# Patient Record
Sex: MALE | Race: WHITE
[De-identification: names, ages, dates, MRNs, and addresses within clinical notes are randomized per-mention and may not be internally consistent; named-entity substitution may affect disease eponyms.]

---

## 2021-03-22 ENCOUNTER — HOSPITAL ENCOUNTER (INPATIENT)
Dept: HOSPITAL 53 - M ED | Age: 31
LOS: 1 days | Discharge: HOME | End: 2021-03-23
Attending: STUDENT IN AN ORGANIZED HEALTH CARE EDUCATION/TRAINING PROGRAM | Admitting: INTERNAL MEDICINE
Payer: COMMERCIAL

## 2021-03-22 VITALS — SYSTOLIC BLOOD PRESSURE: 160 MMHG | DIASTOLIC BLOOD PRESSURE: 96 MMHG

## 2021-03-22 VITALS — BODY MASS INDEX: 26.8 KG/M2 | WEIGHT: 176.81 LBS | HEIGHT: 68 IN

## 2021-03-22 DIAGNOSIS — G40.89: ICD-10-CM

## 2021-03-22 DIAGNOSIS — F17.200: ICD-10-CM

## 2021-03-22 DIAGNOSIS — Z88.0: ICD-10-CM

## 2021-03-22 DIAGNOSIS — F10.239: Primary | ICD-10-CM

## 2021-03-22 LAB
ALBUMIN SERPL BCG-MCNC: 4.3 GM/DL (ref 3.2–5.2)
ALT SERPL W P-5'-P-CCNC: 77 U/L (ref 12–78)
BASOPHILS # BLD AUTO: 0.1 10^3/UL (ref 0–0.2)
BASOPHILS NFR BLD AUTO: 0.4 % (ref 0–1)
BILIRUB CONJ SERPL-MCNC: 0.4 MG/DL (ref 0–0.2)
BILIRUB SERPL-MCNC: 1.3 MG/DL (ref 0.2–1)
CK SERPL-CCNC: 352 U/L (ref 39–308)
EOSINOPHIL # BLD AUTO: 0 10^3/UL (ref 0–0.5)
EOSINOPHIL NFR BLD AUTO: 0.1 % (ref 0–3)
ETHANOL SERPL-MCNC: < 0.003 % (ref 0–0.01)
HCT VFR BLD AUTO: 44.4 % (ref 42–52)
HGB BLD-MCNC: 16.1 G/DL (ref 13.5–17.5)
LYMPHOCYTES # BLD AUTO: 0.5 10^3/UL (ref 1.5–5)
LYMPHOCYTES NFR BLD AUTO: 3.5 % (ref 24–44)
MCH RBC QN AUTO: 34.7 PG (ref 27–33)
MCHC RBC AUTO-ENTMCNC: 36.3 G/DL (ref 32–36.5)
MCV RBC AUTO: 95.7 FL (ref 80–96)
MONOCYTES # BLD AUTO: 0.9 10^3/UL (ref 0–0.8)
MONOCYTES NFR BLD AUTO: 6.5 % (ref 2–8)
NEUTROPHILS # BLD AUTO: 12.3 10^3/UL (ref 1.5–8.5)
NEUTROPHILS NFR BLD AUTO: 88.9 % (ref 36–66)
PLATELET # BLD AUTO: 203 10^3/UL (ref 150–450)
PROLACTIN SERPL-MCNC: 13.2 NG/ML (ref 2.1–17.7)
PROT SERPL-MCNC: 8.2 GM/DL (ref 6.4–8.2)
RBC # BLD AUTO: 4.64 10^6/UL (ref 4.3–6.1)
RSV RNA NPH QL NAA+PROBE: NEGATIVE
WBC # BLD AUTO: 13.8 10^3/UL (ref 4–10)

## 2021-03-22 RX ADMIN — OXAZEPAM SCH MG: 15 CAPSULE, GELATIN COATED ORAL at 18:11

## 2021-03-22 RX ADMIN — MULTIPLE VITAMINS W/ MINERALS TAB SCH TAB: TAB at 18:03

## 2021-03-22 RX ADMIN — SODIUM CHLORIDE SCH MLS/HR: 9 INJECTION, SOLUTION INTRAVENOUS at 19:26

## 2021-03-22 RX ADMIN — FOLIC ACID SCH MG: 1 TABLET ORAL at 18:03

## 2021-03-22 RX ADMIN — Medication SCH MG: at 18:04

## 2021-03-22 RX ADMIN — SODIUM CHLORIDE SCH MLS/HR: 9 INJECTION, SOLUTION INTRAVENOUS at 18:03

## 2021-03-22 RX ADMIN — Medication SCH MG: at 20:31

## 2021-03-22 NOTE — REP
INDICATION:

seizure.



COMPARISON:

None.



TECHNIQUE:

CT BRAIN PERFORMED IN THE AXIAL PLANE.  CORONAL RECONSTRUCTION IMAGES ARE PERFORMED.



FINDINGS:

THE VENTRICLES ARE NORMAL IN SIZE AND POSITION.  THERE IS NO MIDLINE SHIFT OR MASS

EFFECT.  GRAY-WHITE DIFFERENTIATION IS WELL MAINTAINED.  THERE IS NO ACUTE

INTRACRANIAL HEMORRHAGE OR EXTRA-AXIAL FLUID COLLECTION.  BONE WINDOW EXAMINATION IS

UNREMARKABLE.  VISUALIZED MASTOID AIR CELLS AND PARANASAL SINUSES ARE CLEAR.



IMPRESSION:

NEGATIVE NONCONTRAST CT BRAIN.





<Electronically signed by Jayden Montes > 03/22/21 [id number]

## 2021-03-22 NOTE — ECGEPIP
[facility] - ED

                                       

                                       Test Date:    2021

Pat Name:     GRACIE CRISOSTOMO         Department:   

Patient ID:   [id number]                 Room:         -

Gender:       Male                     Technician:   HEATH

:          1990               Requested By: Vicente AG

Order Number: [id number]     Reading MD:   Allyson Brannon

                                 Measurements

Intervals                              Axis          

Rate:         125                      P:            43

MT:           136                      QRS:          43

QRSD:         84                       T:            31

QT:           298                                    

QTc:          430                                    

                           Interpretive Statements

Sinus tachycardia

No prior

Electronically Signed on 3- 17:40:02 EDT by Allyson Brannon

## 2021-03-22 NOTE — HPEPDOC
General


Date of Admission


Mar 22, 2021 at 16:22


Date of Service:  Mar 22, 2021


Attending Physician:  HEBER VALADEZ MD


Chief Complaint


The patient is a 31-year-old male admitted with a reason for visit of Alcohol 

Withdrawal Seizure.


Source:  Patient, RN/MD


Exam Limitations:  No limitations


Timing/Duration:  4-6 hours


Severity:  Severe


Associated Symptoms:  Seizure





History of Present Illness


30 yo M with a history of alcohol use disorder with dependence, one prior 

history of likely seizure when family found him down with a bloody nose and had 

no recollection of how he fell, who lives 4 hours away from [place] in 

[facility] and stopped drinking approximately 24 hours ago in preparation 

for a court appearance in [place] today and had a witnessed seizure in court 

and brought to the ED via ambulance. He denies pre-seizure aura, recent fever, 

chills, nausea, emesis, chest pain, headaches, chronic illnesses for which he 

takes any medications or illicit drug use. 





In the ED, he was tremulous, flushed and hypertensive. He was given 1L NS and 

serax 30 PO and 1mg of IV ativan with improvement of tremor but persistent of 

hypertension. Investigations were notable for leukocytosis to 13.8, Hgb 16.1, 

platelets 203, , prolactin 13.2, Na 133, K 3.9, Cr 0.7, platelets 160, AST

107, ALT 77, Tbili 1.3, Dbili 0.3, troponin negative, EKG with sinus tachycardia

and CT head showed no acute pathology. He is now being admitted for alcohol 

withdrawal c/b seizure.





Home Medications


No Active Prescriptions or Reported Meds





Allergies


Coded Allergies:  


     Penicillins (Verified  Allergy, Unknown, CHILDHOOD REACTION TO AMOXICILLIN,

3/22/21)





Past Medical History


Medical History


Alcohol  use disorder with dependence


Surgical History


None





Family History


Significant Family History:  Heart disease





Social History


* Smoker:  current smoker (1/2 pack a day)


Alcohol:  heavy


Drugs:  denies


Recent Travel/Sick Contacts:  Denies: Recent travel, Recent sick contacts


Psychosocial History:  No pertinent psych hx





A-FIB/CHADSVASC


A-FIB History


Current/History of A-Fib/PAF?:  No


Current PO Anticoag Therapy:  No





Age/Risk Factor Scoring


CHADSVASC:  








CHADSVASC Response (Comments) Value


 


Age Risk Factor Age < 65 years old 0


 


Gender Risk Factor Male 0


 


Hx of CHF No 0


 


Hx of HTN No 0


 


Hx of Stroke/TIA/or VTE No 0


 


Hx of Diabetes No 0


 


Hx of Vascular Disease No 0


 


Total  0











Treatment


Treatment ordered:  NONE


Reason Anticoagulant not given:  Not indicated/[id number]





Review of Systems


Constitutional:  Denies: Chills, Fever, Night Sweats


Eyes:  Denies: Pain, Vision change


ENT:  Denies: Head Aches, Ear Pain, Dysphagia


Skin:  Denies: Rash, Lesions, Breakdown


Pulmonary:  Denies: Dyspnea, Cough


Cardiovascular:  Denies: Chest Pain, Palpitations, Orthopnea, Paroxysmal Noc. Dy

spnea, Lt Headedness


Gastrointestinal:  Denies: Nausea, Vomiting, Abdominal Pain, Diarrhea


Genitourinary:  Denies: Dysuria, Frequency, Incontinence, Retention


Hematologic:  Denies: Bruising, Bleeding Excessively


Endocrine:  Denies: Polydipsia, Polyphagia, Polyuria, Heat Intolerance, Cold 

Intolerance, Other Endocrine Sx


Musculoskeletal:  Denies: Neck Pain, Back Pain, Joint Pain, Muscle Pain, Spasms


Neurological:  Reports: Seizures, Other Symptoms (tremulous); 


   Denies: Weakness, Numbness, Change in speech, Confusion


Psych:  Reports: Anxiety, Depression; 


   Denies: Memory Issues, Thoughts of Self Harm, Anger, Thoughts of Harming 

Other





Physical Examination


General Exam:  Positive: Alert, No Acute Distress


Eye Exam:  Positive: PERRLA, Conjunctiva & lids normal, EOMI; 


   Negative: Sclera icteric


ENT Exam:  Positive: Atraumatic, Mucous membr. moist/pink, Pharynx Normal


Neck Exam:  Positive: Supple; 


   Negative: JVD, thyromegaly


Chest Exam:  Positive: Clear to auscultation, Normal air movement


Heart Exam:  Positive: Tachycardic, Regular Rhythm, Normal S1, Normal S2; 


   Negative: Murmurs, Rubs


Telemetry:  Positive: Tachycardia


Abdomen Exam:  Positive: Normal bowel sounds, Soft; 


   Negative: Tenderness, Hepatospenomegaly


Extremity Exam:  Positive: Normal pulses; 


   Negative: Clubbing, Cyanosis, Edema


Skin Exam:  Positive: Nl turgor and temperature; 


   Negative: Breakdown, Lesion


Neuro Exam:  Positive: Normal Speech, Strength at 5/5 X4 ext, Normal Tone, 

Sensation Intact, Cranial Nerves 3-12 NL, Other (tremulous)


Psych Exam:  Positive: Mental status NL, Anxiety, Oriented x 3





Vital Signs





Vital Signs








  Date Time  Temp Pulse Resp B/P (MAP) Pulse Ox O2 Delivery O2 Flow Rate FiO2


 


3/22/21 15:56 98.8 101 16 163/96 (118) 98 Room Air  











Laboratory Data


Labs 24H


Laboratory Tests 2


3/22/21 12:16: 


POC Glucose (Misc Panel) 160H, POC Sodium (Misc Panel) 133L, POC Potassium (Misc

Panel) 3.9, POC Chloride (Misc Panel) 97L, POC Total CO2 (Misc Panel) 25.0, POC 

Blood Urea Nitrogen (Misc Panel 4L, POC Ionized Calcium (Misc Panel) 4.7, POC 

Creatinine (Misc Panel) 0.7, POC Hematocrit (Misc Panel) 50.0


3/22/21 12:18: 


Immature Granulocyte % (Auto) 0.6, Neutrophils (%) (Auto) 88.9H, Lymphocytes (%)

(Auto) 3.5L, Monocytes (%) (Auto) 6.5, Eosinophils (%) (Auto) 0.1, Basophils (%)

(Auto) 0.4, Neutrophils # (Auto) 12.3H, Lymphocytes # (Auto) 0.5L, Monocytes # 

(Auto) 0.9H, Eosinophils # (Auto) 0.0, Basophils # (Auto) 0.1, Nucleated Red 

Blood Cells % (auto) 0.0, Total Bilirubin 1.3H, Direct Bilirubin 0.4H, Aspartate

Amino Transf (AST/SGOT) 107H, Alanine Aminotransferase (ALT/SGPT) 77, Alkaline 

Phosphatase 104, Total Creatine Kinase 352H, Total Protein 8.2, Albumin 4.3, 

Albumin/Globulin Ratio 1.1, Prolactin 13.2, Ethyl Alcohol Level < 0.003


3/22/21 12:21: POC Troponin I (Misc) 0.01


CBC/BMP


Laboratory Tests


3/22/21 12:18











 Assessment/Plan


30 yo M with a history of alcohol use disorder with dependence, one prior 

history of likely seizure when family found him down with a bloody nose and had 

no recollection of how he fell, who lives 4 hours away from [place] in 

[facility] and stopped drinking approximately 24 hours ago in preparation 

for a court appearance in [place] today and had a witnessed seizure in court 

and brought to the ED now being admitted for alcohol withdrawal c/b seizure.





Alcohol use disorder with dependence, admitted for withdrawal with seizure:


-s/p PO serax and IV ativan in the ED


-serax 30Q6H


-CIWA with symptom triggered ativan PRN


-multivitamin


-thiamine


-folic acid


-NS at 125cc/hr to 1 more liter


-regular diet


-will need to return to his PCP for alcohol use disorder outpatient treatment 

options, as he does not reside in [place]





Nicotine addiction


-14mg/24h patch, for 1/2 pack per day history


-counseled on benefits of cessation, will contemplate quitting





DVT ppx: lovenox 40mg QD


Dispo: medsurg with tele





Plan / VTE


VTE Prophylaxis Ordered?:  Yes











HEBER VALADEZ MD   Mar 22, 2021 17:12

## 2021-03-23 VITALS — DIASTOLIC BLOOD PRESSURE: 92 MMHG | SYSTOLIC BLOOD PRESSURE: 146 MMHG

## 2021-03-23 VITALS — DIASTOLIC BLOOD PRESSURE: 98 MMHG | SYSTOLIC BLOOD PRESSURE: 158 MMHG

## 2021-03-23 VITALS — DIASTOLIC BLOOD PRESSURE: 97 MMHG | SYSTOLIC BLOOD PRESSURE: 143 MMHG

## 2021-03-23 LAB
ALBUMIN SERPL BCG-MCNC: 3.7 GM/DL (ref 3.2–5.2)
ALT SERPL W P-5'-P-CCNC: 57 U/L (ref 12–78)
BILIRUB SERPL-MCNC: 1.9 MG/DL (ref 0.2–1)
BUN SERPL-MCNC: 5 MG/DL (ref 7–18)
CALCIUM SERPL-MCNC: 8.8 MG/DL (ref 8.5–10.1)
CHLORIDE SERPL-SCNC: 104 MEQ/L (ref 98–107)
CO2 SERPL-SCNC: 27 MEQ/L (ref 21–32)
CREAT SERPL-MCNC: 0.65 MG/DL (ref 0.7–1.3)
GFR SERPL CREATININE-BSD FRML MDRD: > 60 ML/MIN/{1.73_M2} (ref 60–?)
GLUCOSE SERPL-MCNC: 84 MG/DL (ref 70–100)
HCT VFR BLD AUTO: 44.4 % (ref 42–52)
HGB BLD-MCNC: 15.5 G/DL (ref 13.5–17.5)
MAGNESIUM SERPL-MCNC: 2.4 MG/DL (ref 1.8–2.4)
MCH RBC QN AUTO: 34.1 PG (ref 27–33)
MCHC RBC AUTO-ENTMCNC: 34.9 G/DL (ref 32–36.5)
MCV RBC AUTO: 97.6 FL (ref 80–96)
PLATELET # BLD AUTO: 188 10^3/UL (ref 150–450)
POTASSIUM SERPL-SCNC: 3.3 MEQ/L (ref 3.5–5.1)
PROT SERPL-MCNC: 7.3 GM/DL (ref 6.4–8.2)
RBC # BLD AUTO: 4.55 10^6/UL (ref 4.3–6.1)
SODIUM SERPL-SCNC: 139 MEQ/L (ref 136–145)
WBC # BLD AUTO: 8.6 10^3/UL (ref 4–10)

## 2021-03-23 RX ADMIN — OXAZEPAM SCH MG: 15 CAPSULE, GELATIN COATED ORAL at 00:42

## 2021-03-23 RX ADMIN — Medication SCH MG: at 09:14

## 2021-03-23 RX ADMIN — MULTIPLE VITAMINS W/ MINERALS TAB SCH TAB: TAB at 09:14

## 2021-03-23 RX ADMIN — OXAZEPAM SCH MG: 15 CAPSULE, GELATIN COATED ORAL at 11:40

## 2021-03-23 RX ADMIN — FOLIC ACID SCH MG: 1 TABLET ORAL at 09:14

## 2021-03-23 RX ADMIN — OXAZEPAM SCH MG: 15 CAPSULE, GELATIN COATED ORAL at 05:52

## 2021-03-23 NOTE — DS.PDOC
Discharge Summary


General


Date of Admission


Mar 22, 2021 at 16:22


Date of Discharge


Mar 23, 2021





Discharge Summary


PROCEDURES PERFORMED DURING STAY: None





ADMITTING DIAGNOSES: 


1. Seizure 2/2 alcohol withdrawal


2. Alcohol use disorder


3. Nicotine addiction





DISCHARGE DIAGNOSES:


1. Seizure 2/2 alcohol withdrawal


2. Alcohol use disorder


3. Nicotine addiction





COMPLICATIONS/CHIEF COMPLAINT: Seizure.





HISTORY OF PRESENT ILLNESS: Mr. Moralez is a 31 year old male with alcohol use 

disorder who present with seizure. Patient lives in [place], [place] which is 

about 4 hours away from [place], [place]. He is here for a court hearing and he 

stopped drinking 24 hours prior to his court appearance. While in court, he had 

a witnessed seizure and was brought to the ED. Patient was admitted for seizure 

due to alcohol withdrawal.





HOSPITAL COURSE: Patient was put on thiamine, folic acid, multivitamin, and 

oxazepam for alcohol withdrawal. He was also on Lorazepam per [facility] protocol. He 

did well overnight and did not require any Lorazepam. No seizures overnight. He 

got better faster than expected. He felt well today and felt ready for home. 

Otherwise, I spoke with the mother. She requested inpatient detox. We cannot 

sent patients to inpatient detox, but I recommended continuing oxazepam and 

speak with PCP about resources in the community for continuing alcohol 

abstinence or inpatient detox. Oxazepam was sent to a pharmacy here in 

[place], [place] and discharged home today.  





DISCHARGE MEDICATIONS: Please see below.


 


ALLERGIES: Please see below.





PHYSICAL EXAMINATION ON DISCHARGE:


VITAL SIGNS: Please see below.


GENERAL: Comfortable, in no apparent distress


HEENT: Head normocephalic, atraumatic


NECK: Supple


CARDIOVASCULAR EXAMINATION: Regular rate and rhythm


RESPIRATORY EXAMINATION: Lungs clear to auscultation bilaterally


ABDOMINAL EXAMINATION: Soft, non-tender, normal bowel sounds


EXTREMITIES: No pitting edema bilaterally


SKIN: Warm and dry


NEUROLOGICAL EXAMINATION: CN 3-12 grossly intact


PSYCHIATRIC EXAMINATION: Normal mood and affect





LABORATORY DATA: Please see below.





IMAGING: Radiologist interpretation 


CT head


NEGATIVE NONCONTRAST CT BRAIN.





PROGNOSIS: Good





ACTIVITY: As tolerated.





DIET: As tolerated.





DISCHARGE PLAN: Home





DISPOSITION: 01 Home, Self-Care.





DISCHARGE INSTRUCTIONS:


1. Follow up with your PCP as soon as possible to discuss alcohol cessation


2. Continue oxazepam for 2 days. Do not drink alcohol while on oxazepam





DISCHARGE CONDITION: Stable.





Total time spent on discharge planning, discharge summary, and medication 

reconciliation: 55 minutes





Vital Signs/I&Os





Vital Signs








  Date Time  Temp Pulse Resp B/P (MAP) Pulse Ox O2 Delivery O2 Flow Rate FiO2


 


3/23/21 09:01  78  158/98    


 


3/23/21 06:00 98.2  18  97 Room Air  














I&O- Last 24 Hours up to 6 AM 


 


 3/23/21





 06:00


 


Intake Total 1325 ml


 


Output Total 0 ml


 


Balance 1325 ml











Laboratory Data


Labs 24H


Laboratory Tests 2


3/23/21 05:38: 


Nucleated Red Blood Cells % (auto) 0.0, Anion Gap 8, Glomerular Filtration Rate 

> 60.0, Calcium Level 8.8, Magnesium Level 2.4, Total Bilirubin 1.9H, Aspartate 

Amino Transf (AST/SGOT) 53H, Alanine Aminotransferase (ALT/SGPT) 57, Alkaline P

hosphatase 93, Total Protein 7.3, Albumin 3.7, Albumin/Globulin Ratio 1.0


CBC/BMP


Laboratory Tests


3/23/21 05:38











Discharge Medications


Scheduled


Multivitamins (Thera M Plus Tablet) 1 Each Tablet, 1 TAB PO DAILY


Oxazepam (Oxazepam) 15 Mg Capsule, 15 MG PO BID


Thiamine Hcl (Vitamin B-1) 100 Mg Tablet, 100 MG PO BID





Allergies


Coded Allergies:  


     Penicillins (Verified  Allergy, Unknown, CHILDHOOD REACTION TO AMOXICILLIN,

3/22/21)











DELORES JEREZ DO               Mar 23, 2021 23:36